# Patient Record
Sex: MALE | Race: WHITE | NOT HISPANIC OR LATINO | URBAN - METROPOLITAN AREA
[De-identification: names, ages, dates, MRNs, and addresses within clinical notes are randomized per-mention and may not be internally consistent; named-entity substitution may affect disease eponyms.]

---

## 2023-03-10 DIAGNOSIS — E78.5 DYSLIPIDEMIA: Primary | ICD-10-CM

## 2023-03-10 RX ORDER — ROSUVASTATIN CALCIUM 5 MG/1
5 TABLET, COATED ORAL DAILY
Qty: 90 TABLET | Refills: 3 | Status: SHIPPED | OUTPATIENT
Start: 2023-03-10 | End: 2024-03-09

## 2023-04-12 PROBLEM — E78.5 DYSLIPIDEMIA: Status: ACTIVE | Noted: 2023-04-12

## 2023-04-12 PROBLEM — I10 ESSENTIAL HYPERTENSION: Status: ACTIVE | Noted: 2023-04-12

## 2023-04-12 PROBLEM — J30.2 SEASONAL ALLERGIES: Status: ACTIVE | Noted: 2023-04-12

## 2023-04-12 PROBLEM — G47.33 OBSTRUCTIVE SLEEP APNEA: Status: ACTIVE | Noted: 2023-04-12
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
## 2023-04-12 PROBLEM — M25.569 JOINT PAIN, KNEE: Status: ACTIVE | Noted: 2023-04-12

## 2023-04-12 PROBLEM — K21.9 ESOPHAGEAL REFLUX: Status: ACTIVE | Noted: 2023-04-12

## 2023-04-12 PROBLEM — G47.19 EXCESSIVE DAYTIME SLEEPINESS: Status: ACTIVE | Noted: 2023-04-12

## 2023-04-12 PROBLEM — F32.A DEPRESSION: Status: ACTIVE | Noted: 2023-04-12

## 2023-04-12 PROBLEM — E66.01 MORBID OBESITY (MULTI): Status: ACTIVE | Noted: 2023-04-12

## 2023-04-12 PROBLEM — E34.9 HYPOTESTOSTERONISM: Status: ACTIVE | Noted: 2023-04-12

## 2023-04-12 PROBLEM — S16.1XXA CERVICAL STRAIN: Status: ACTIVE | Noted: 2023-04-12

## 2023-04-19 RX ORDER — CETIRIZINE HYDROCHLORIDE, PSEUDOEPHEDRINE HYDROCHLORIDE 5; 120 MG/1; MG/1
TABLET, FILM COATED, EXTENDED RELEASE ORAL
COMMUNITY
Start: 2016-08-10

## 2023-04-19 RX ORDER — ALBUTEROL SULFATE 90 UG/1
AEROSOL, METERED RESPIRATORY (INHALATION)
COMMUNITY
Start: 2015-08-25

## 2023-04-19 RX ORDER — ESCITALOPRAM OXALATE 10 MG/1
1 TABLET ORAL DAILY
COMMUNITY
Start: 2021-02-11 | End: 2024-04-01 | Stop reason: SDUPTHER

## 2023-04-19 RX ORDER — NYSTATIN 100000 [USP'U]/G
POWDER TOPICAL
COMMUNITY
Start: 2020-12-11

## 2023-04-19 RX ORDER — OMEPRAZOLE 20 MG/1
CAPSULE, DELAYED RELEASE ORAL
COMMUNITY
Start: 2016-08-10

## 2023-04-19 RX ORDER — SEMAGLUTIDE 0.5 MG/.5ML
INJECTION, SOLUTION SUBCUTANEOUS
COMMUNITY
Start: 2021-07-16

## 2023-04-19 RX ORDER — ANASTROZOLE 1 MG/1
TABLET ORAL
COMMUNITY

## 2023-10-16 DIAGNOSIS — M25.569 PAIN IN UNSPECIFIED KNEE: ICD-10-CM

## 2023-10-16 RX ORDER — INDOMETHACIN 75 MG/1
CAPSULE, EXTENDED RELEASE ORAL
Qty: 30 CAPSULE | Refills: 3 | Status: SHIPPED | OUTPATIENT
Start: 2023-10-16 | End: 2024-03-22

## 2023-12-08 ENCOUNTER — TELEPHONE (OUTPATIENT)
Dept: PRIMARY CARE | Facility: CLINIC | Age: 38
End: 2023-12-08
Payer: COMMERCIAL

## 2023-12-08 NOTE — TELEPHONE ENCOUNTER
Can you please run the new Zepbound through and see if my insurance will cover it now?     Medication is not covered

## 2024-01-19 ENCOUNTER — OFFICE VISIT (OUTPATIENT)
Dept: ORTHOPEDIC SURGERY | Facility: HOSPITAL | Age: 39
End: 2024-01-19
Payer: COMMERCIAL

## 2024-01-19 DIAGNOSIS — M25.561 ARTHRALGIA OF BOTH KNEES: Primary | ICD-10-CM

## 2024-01-19 DIAGNOSIS — M25.562 ARTHRALGIA OF BOTH KNEES: Primary | ICD-10-CM

## 2024-01-19 PROCEDURE — 2500000005 HC RX 250 GENERAL PHARMACY W/O HCPCS: Performed by: ORTHOPAEDIC SURGERY

## 2024-01-19 PROCEDURE — 99213 OFFICE O/P EST LOW 20 MIN: CPT | Performed by: ORTHOPAEDIC SURGERY

## 2024-01-19 PROCEDURE — 20610 DRAIN/INJ JOINT/BURSA W/O US: CPT | Performed by: ORTHOPAEDIC SURGERY

## 2024-01-19 PROCEDURE — 2500000004 HC RX 250 GENERAL PHARMACY W/ HCPCS (ALT 636 FOR OP/ED): Performed by: ORTHOPAEDIC SURGERY

## 2024-01-19 RX ORDER — LIDOCAINE HYDROCHLORIDE 10 MG/ML
4 INJECTION INFILTRATION; PERINEURAL
Status: COMPLETED | OUTPATIENT
Start: 2024-01-19 | End: 2024-01-19

## 2024-01-19 RX ORDER — METHYLPREDNISOLONE ACETATE 40 MG/ML
40 INJECTION, SUSPENSION INTRA-ARTICULAR; INTRALESIONAL; INTRAMUSCULAR; SOFT TISSUE
Status: COMPLETED | OUTPATIENT
Start: 2024-01-19 | End: 2024-01-19

## 2024-01-19 RX ADMIN — METHYLPREDNISOLONE ACETATE 40 MG: 40 INJECTION, SUSPENSION INTRA-ARTICULAR; INTRALESIONAL; INTRAMUSCULAR; INTRASYNOVIAL; SOFT TISSUE at 11:55

## 2024-01-19 RX ADMIN — LIDOCAINE HYDROCHLORIDE 4 ML: 10 INJECTION, SOLUTION INFILTRATION; PERINEURAL at 11:55

## 2024-01-19 NOTE — PROGRESS NOTES
Patient returns to see me today.  He has known osteoarthritis of both knees he wanted proceed with cortisone injection I think that is reasonable.    Their limb is warm, and well-perfused.  They have intact sensation to light touch in all lower extremity dermatomes.  Their quadriceps and hamstring strength is 5 of 5.  Their skin is intact.  Their limb is stable from a ligament standpoint  There is a trace effusion.  There is one out of 3 patellofemoral crepitus    Range of motion is preserved  Patient ID: Rene Taylor is a 38 y.o. male.    L Inj/Asp: bilateral knee on 1/19/2024 11:55 AM  Indications: pain  Details: 22 G needle, anterior approach  Medications (Right): 40 mg methylPREDNISolone acetate 40 mg/mL; 4 mL lidocaine 10 mg/mL (1 %)  Medications (Left): 40 mg methylPREDNISolone acetate 40 mg/mL; 4 mL lidocaine 10 mg/mL (1 %)    Under sterile conditions the left and right knee were injected with 4cc of lidocaine 40mg DepoMedrol.  The patient tolerated the procedure well.  There were no apparent complications.  Sterile bandage was applied.  Procedure, treatment alternatives, risks and benefits explained, specific risks discussed. Consent was given by the patient. Immediately prior to procedure a time out was called to verify the correct patient, procedure, equipment, support staff and site/side marked as required. Patient was prepped and draped in the usual sterile fashion.       Patient tolerated the injections well no apparent complications follow-up as needed

## 2024-03-11 ENCOUNTER — TELEPHONE (OUTPATIENT)
Dept: ORTHOPEDIC SURGERY | Facility: HOSPITAL | Age: 39
End: 2024-03-11
Payer: COMMERCIAL

## 2024-03-11 NOTE — TELEPHONE ENCOUNTER
Called patient to find out which laterality we would be  seeing him for his shoulder pain.  Patient didn't answer.  Left a voicemail asking him to call the office at [phone] and let us know so that we could put in an x-ray order to have at the time of visit. CT

## 2024-03-13 ENCOUNTER — APPOINTMENT (OUTPATIENT)
Dept: ORTHOPEDIC SURGERY | Facility: HOSPITAL | Age: 39
End: 2024-03-13
Payer: COMMERCIAL

## 2024-03-22 DIAGNOSIS — M25.569 PAIN IN UNSPECIFIED KNEE: ICD-10-CM

## 2024-03-22 RX ORDER — INDOMETHACIN 75 MG/1
CAPSULE, EXTENDED RELEASE ORAL
Qty: 90 CAPSULE | Refills: 1 | Status: SHIPPED | OUTPATIENT
Start: 2024-03-22

## 2024-03-29 ENCOUNTER — PATIENT MESSAGE (OUTPATIENT)
Dept: PRIMARY CARE | Facility: CLINIC | Age: 39
End: 2024-03-29
Payer: COMMERCIAL

## 2024-03-29 DIAGNOSIS — F32.A DEPRESSION, UNSPECIFIED DEPRESSION TYPE: ICD-10-CM

## 2024-04-01 RX ORDER — ESCITALOPRAM OXALATE 10 MG/1
10 TABLET ORAL DAILY
Qty: 30 TABLET | Refills: 11 | Status: SHIPPED | OUTPATIENT
Start: 2024-04-01 | End: 2025-04-01

## 2024-04-01 NOTE — TELEPHONE ENCOUNTER
From: Rene Taylor  To: Chano Salcido DO  Sent: 3/29/2024 4:10 PM EDT  Subject: Previous dr rx    My previous dr had me on ESCITALOPRAM 10 MG TABLET and I am out of refills, can you please send a rx for it?

## 2024-04-15 ENCOUNTER — OFFICE VISIT (OUTPATIENT)
Dept: ORTHOPEDIC SURGERY | Facility: HOSPITAL | Age: 39
End: 2024-04-15
Payer: COMMERCIAL

## 2024-04-15 ENCOUNTER — HOSPITAL ENCOUNTER (OUTPATIENT)
Dept: RADIOLOGY | Facility: HOSPITAL | Age: 39
Discharge: HOME | End: 2024-04-15
Payer: COMMERCIAL

## 2024-04-15 DIAGNOSIS — M75.82 TENDONITIS OF BOTH ROTATOR CUFFS: Primary | ICD-10-CM

## 2024-04-15 DIAGNOSIS — M25.511 BILATERAL SHOULDER PAIN, UNSPECIFIED CHRONICITY: ICD-10-CM

## 2024-04-15 DIAGNOSIS — M25.512 BILATERAL SHOULDER PAIN, UNSPECIFIED CHRONICITY: ICD-10-CM

## 2024-04-15 DIAGNOSIS — M75.81 TENDONITIS OF BOTH ROTATOR CUFFS: Primary | ICD-10-CM

## 2024-04-15 PROCEDURE — 73030 X-RAY EXAM OF SHOULDER: CPT | Mod: BILATERAL PROCEDURE | Performed by: RADIOLOGY

## 2024-04-15 PROCEDURE — 99214 OFFICE O/P EST MOD 30 MIN: CPT | Performed by: ORTHOPAEDIC SURGERY

## 2024-04-15 PROCEDURE — 1036F TOBACCO NON-USER: CPT | Performed by: ORTHOPAEDIC SURGERY

## 2024-04-15 PROCEDURE — 73030 X-RAY EXAM OF SHOULDER: CPT | Mod: 50

## 2024-04-15 ASSESSMENT — PAIN SCALES - GENERAL: PAINLEVEL_OUTOF10: 5 - MODERATE PAIN

## 2024-04-15 ASSESSMENT — PAIN - FUNCTIONAL ASSESSMENT: PAIN_FUNCTIONAL_ASSESSMENT: 0-10

## 2024-04-15 ASSESSMENT — PAIN DESCRIPTION - DESCRIPTORS: DESCRIPTORS: ACHING;SHARP

## 2024-04-15 NOTE — PROGRESS NOTES
Chief Complaint: Pain of the Right Shoulder (R > L) and Pain of the Left Shoulder     HPI:  Rene Taylor is a 38 y.o. male right-hand-dominant  presenting with bilateral shoulder pain right greater than left for the past year of insidious onset.  He reports a generalized soreness with a sharp sensation over the anterior bilateral shoulders when he is pushing himself up off the ground or abducting his shoulders with weight.  He has been using his indomethacin prescribed to him for his bilateral knee pain with some improvement of his symptoms.  He denies prior shoulder injuries.  He denies any numbness or tingling into the bilateral upper extremities.  He presents for treatment recommendations.    Objective     ROS:  Constitutional: No fever, no chills, not feeling tired, no recent weight gain and no recent weight loss  ENT: No nosebleeds  Cardiovascular: No chest pain  Respiratory: No shortness of breath and no cough  Gastrointestinal: No abdominal pain, no nausea, no diarrhea, and no vomiting  Musculoskeletal: Positive for bilateral shoulder pain right greater than left  Integumentary: No rashes and no skin lesions  Neurological: No headache  Psychiatric: No sleep disturbances no depression  Endocrine: No muscle weakness and no muscle cramps  Hematologic/lymphatic: No swelling glands and no tendency for easy bruising    Past Medical History:   Diagnosis Date    Personal history of other diseases of the nervous system and sense organs     History of sleep apnea    Sleep apnea         Past Surgical History:   Procedure Laterality Date    KNEE SURGERY Left         Social Connections: Not on file          Physical Exam:  General appearance: WN, WD male in no acute distress  Skin: No rashes, lesions or wounds  Head: Normocephalic, no evidence of trauma  Eye: EOMI, conjunctiva clear, no discharge  ENT: Nares patent  Neck: No abnormal contour, tracheal midline  Chest/lungs: No respiratory distress, speaking  in complete sentences  Musculoskeletal: RIGHT SHOULDER: Tender to palpation over the bicipital groove, anterior shoulder.  Positive Gibson Be.  Positive Neer's.  Positive Laclede's.  4+/5 manual muscle testing shoulder abduction, empty can, external rotation.  He has internal rotation to approximately L2. LEFT SHOULDER: Tender to palpation bicipital groove.  Positive Gibson Be.  Positive Neer's.  Positive Laclede's.  4+/5 manual muscle testing shoulder abduction, empty can, external rotation.  Internal rotation to approximately L2.  Negative Spurling's.  He has full and symmetric shoulder range of motion bilaterally.  2+ radial pulses bilaterally.  No muscle wasting, rigidity    Neurological: A&O x3, no focal deficits, intact bilateral UE  Psych: normal affect, mood, appearance      Image Results:  X-rays of the bilateral shoulders taken on 4/15/2024 reviewed with the patient in the office today and reveal no acute fractures or dislocations.      Assessment/Plan   Encounter Diagnoses:  Tendonitis of both rotator cuffs    Bilateral shoulder pain, unspecified chronicity    Orders Placed This Encounter    XR shoulder 2+ views bilateral    Referral to Physical Therapy       The patient and I discussed his clinical presentation and physical exam findings consistent with bilateral rotator cuff tendinitis, right greater than left.  We agreed upon continued conservative management at this time.  Since he is already taking indomethacin for his bilateral knees he will continue to take this for his shoulders.  Additionally, he was provided with a referral to physical therapy.  This will focus on capsular strength and stability, posture assessment and home exercise program.  Work modifications were discussed with the patient in the office today.  He should focus on an upright posture.  He can ice his shoulder 15-20 minutes at a time 2-3 times per day.  We did discuss the use of subacromial corticosteroid injection,  which we deferred to later if needed.  He will follow-up in 4 weeks if his symptoms persist or worsen.  He is in agreement this plan.  All his questions have been answered.        ** This office note was dictated using Dragon voice to text software and was not proofread for spelling or grammatical errors **

## 2024-06-12 ENCOUNTER — OFFICE VISIT (OUTPATIENT)
Dept: ORTHOPEDIC SURGERY | Facility: HOSPITAL | Age: 39
End: 2024-06-12
Payer: COMMERCIAL

## 2024-06-12 DIAGNOSIS — M25.561 ARTHRALGIA OF BOTH KNEES: Primary | ICD-10-CM

## 2024-06-12 DIAGNOSIS — M25.562 ARTHRALGIA OF BOTH KNEES: Primary | ICD-10-CM

## 2024-06-12 PROCEDURE — 99213 OFFICE O/P EST LOW 20 MIN: CPT | Performed by: PHYSICIAN ASSISTANT

## 2024-06-12 NOTE — PROGRESS NOTES
Bilateral knee    Patient is here today for follow-up on bilateral knee pain.  He has known osteoarthritis.  Imaging was reviewed.  He has had corticosteroid injections in the past that have been very beneficial.  He also recently saw Dr. Crowell for her shoulders and he had cortisone injections there and those are definitely feeling better.  He wants to proceed with the knee injections today and I think that is reasonable.      L Inj/Asp: bilateral knee on 6/14/2024 8:58 AM  Indications: pain  Details: 22 G needle, anterior approach  Medications (Right): 40 mg methylPREDNISolone acetate 40 mg/mL; 4 mL lidocaine 10 mg/mL (1 %)  Medications (Left): 40 mg methylPREDNISolone acetate 40 mg/mL; 4 mL lidocaine 10 mg/mL (1 %)    Under sterile conditions the left and right knee were injected with 4cc of lidocaine 40mg DepoMedrol.  The patient tolerated the procedure well.  There were no apparent complications.  Sterile bandage was applied.  Procedure, treatment alternatives, risks and benefits explained, specific risks discussed. Consent was given by the patient. Immediately prior to procedure a time out was called to verify the correct patient, procedure, equipment, support staff and site/side marked as required. Patient was prepped and draped in the usual sterile fashion.                 Viviane Fraga PA-C

## 2024-06-14 PROCEDURE — 20610 DRAIN/INJ JOINT/BURSA W/O US: CPT | Mod: 50 | Performed by: PHYSICIAN ASSISTANT

## 2024-06-14 RX ORDER — METHYLPREDNISOLONE ACETATE 40 MG/ML
40 INJECTION, SUSPENSION INTRA-ARTICULAR; INTRALESIONAL; INTRAMUSCULAR; SOFT TISSUE
Status: COMPLETED | OUTPATIENT
Start: 2024-06-14 | End: 2024-06-14

## 2024-06-14 RX ORDER — LIDOCAINE HYDROCHLORIDE 10 MG/ML
4 INJECTION INFILTRATION; PERINEURAL
Status: COMPLETED | OUTPATIENT
Start: 2024-06-14 | End: 2024-06-14

## 2024-07-29 ENCOUNTER — OFFICE VISIT (OUTPATIENT)
Dept: PRIMARY CARE | Facility: CLINIC | Age: 39
End: 2024-07-29
Payer: COMMERCIAL

## 2024-07-29 VITALS
BODY MASS INDEX: 44.1 KG/M2 | HEART RATE: 63 BPM | RESPIRATION RATE: 18 BRPM | WEIGHT: 315 LBS | OXYGEN SATURATION: 98 % | TEMPERATURE: 96.8 F | HEIGHT: 71 IN

## 2024-07-29 DIAGNOSIS — Z00.00 ANNUAL PHYSICAL EXAM: Primary | ICD-10-CM

## 2024-07-29 DIAGNOSIS — E11.9 DIABETES MELLITUS WITHOUT COMPLICATION (MULTI): ICD-10-CM
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
## 2024-07-29 PROBLEM — F41.9 ANXIETY: Status: ACTIVE | Noted: 2024-07-29

## 2024-07-29 PROBLEM — S16.1XXA CERVICAL STRAIN: Status: RESOLVED | Noted: 2023-04-12 | Resolved: 2024-07-29

## 2024-07-29 LAB
ALBUMIN SERPL-MCNC: 4.3 G/DL (ref 3.5–5)
ALP BLD-CCNC: 101 U/L (ref 35–125)
ALT SERPL-CCNC: 32 U/L (ref 5–40)
ANION GAP SERPL CALC-SCNC: 13 MMOL/L
AST SERPL-CCNC: 22 U/L (ref 5–40)
BASOPHILS # BLD AUTO: 0.06 X10*3/UL (ref 0–0.1)
BASOPHILS NFR BLD AUTO: 0.8 %
BILIRUB SERPL-MCNC: 0.7 MG/DL (ref 0.1–1.2)
BUN SERPL-MCNC: 19 MG/DL (ref 8–25)
CALCIUM SERPL-MCNC: 9.5 MG/DL (ref 8.5–10.4)
CHLORIDE SERPL-SCNC: 102 MMOL/L (ref 97–107)
CHOLEST SERPL-MCNC: 198 MG/DL (ref 133–200)
CHOLEST/HDLC SERPL: 5.1 {RATIO}
CO2 SERPL-SCNC: 24 MMOL/L (ref 24–31)
CREAT SERPL-MCNC: 0.9 MG/DL (ref 0.4–1.6)
EGFRCR SERPLBLD CKD-EPI 2021: >90 ML/MIN/1.73M*2
EOSINOPHIL # BLD AUTO: 0.3 X10*3/UL (ref 0–0.7)
EOSINOPHIL NFR BLD AUTO: 4 %
ERYTHROCYTE [DISTWIDTH] IN BLOOD BY AUTOMATED COUNT: 12.6 % (ref 11.5–14.5)
GLUCOSE SERPL-MCNC: 115 MG/DL (ref 65–99)
HCT VFR BLD AUTO: 46 % (ref 41–52)
HDLC SERPL-MCNC: 39 MG/DL
HGB BLD-MCNC: 15.1 G/DL (ref 13.5–17.5)
IMM GRANULOCYTES # BLD AUTO: 0.02 X10*3/UL (ref 0–0.7)
IMM GRANULOCYTES NFR BLD AUTO: 0.3 % (ref 0–0.9)
LDLC SERPL CALC-MCNC: 124 MG/DL (ref 65–130)
LYMPHOCYTES # BLD AUTO: 2.4 X10*3/UL (ref 1.2–4.8)
LYMPHOCYTES NFR BLD AUTO: 31.9 %
MCH RBC QN AUTO: 30 PG (ref 26–34)
MCHC RBC AUTO-ENTMCNC: 32.8 G/DL (ref 32–36)
MCV RBC AUTO: 91 FL (ref 80–100)
MONOCYTES # BLD AUTO: 0.72 X10*3/UL (ref 0.1–1)
MONOCYTES NFR BLD AUTO: 9.6 %
NEUTROPHILS # BLD AUTO: 4.02 X10*3/UL (ref 1.2–7.7)
NEUTROPHILS NFR BLD AUTO: 53.4 %
NRBC BLD-RTO: 0 /100 WBCS (ref 0–0)
PLATELET # BLD AUTO: 253 X10*3/UL (ref 150–450)
POC HEMOGLOBIN A1C: 5.6 % (ref 4.2–6.5)
POTASSIUM SERPL-SCNC: 4.4 MMOL/L (ref 3.4–5.1)
PROT SERPL-MCNC: 7.3 G/DL (ref 5.9–7.9)
RBC # BLD AUTO: 5.04 X10*6/UL (ref 4.5–5.9)
SODIUM SERPL-SCNC: 139 MMOL/L (ref 133–145)
TRIGL SERPL-MCNC: 174 MG/DL (ref 40–150)
TSH SERPL DL<=0.05 MIU/L-ACNC: 2.34 MIU/L (ref 0.27–4.2)
WBC # BLD AUTO: 7.5 X10*3/UL (ref 4.4–11.3)

## 2024-07-29 PROCEDURE — 84443 ASSAY THYROID STIM HORMONE: CPT | Performed by: FAMILY MEDICINE

## 2024-07-29 PROCEDURE — 85025 COMPLETE CBC W/AUTO DIFF WBC: CPT | Performed by: FAMILY MEDICINE

## 2024-07-29 PROCEDURE — 3008F BODY MASS INDEX DOCD: CPT | Performed by: FAMILY MEDICINE

## 2024-07-29 PROCEDURE — 99396 PREV VISIT EST AGE 40-64: CPT | Performed by: FAMILY MEDICINE

## 2024-07-29 PROCEDURE — 36415 COLL VENOUS BLD VENIPUNCTURE: CPT | Performed by: FAMILY MEDICINE

## 2024-07-29 PROCEDURE — 84075 ASSAY ALKALINE PHOSPHATASE: CPT | Performed by: FAMILY MEDICINE

## 2024-07-29 PROCEDURE — 80061 LIPID PANEL: CPT | Performed by: FAMILY MEDICINE

## 2024-07-29 PROCEDURE — 1036F TOBACCO NON-USER: CPT | Performed by: FAMILY MEDICINE

## 2024-07-29 PROCEDURE — 83036 HEMOGLOBIN GLYCOSYLATED A1C: CPT | Performed by: FAMILY MEDICINE

## 2024-07-29 RX ORDER — SEMAGLUTIDE 1 MG/.5ML
1 INJECTION, SOLUTION SUBCUTANEOUS
Qty: 2 ML | Refills: 3 | Status: SHIPPED | OUTPATIENT
Start: 2024-08-04 | End: 2024-08-26

## 2024-07-29 ASSESSMENT — PROMIS GLOBAL HEALTH SCALE
RATE_PHYSICAL_HEALTH: FAIR
EMOTIONAL_PROBLEMS: RARELY
RATE_SOCIAL_SATISFACTION: GOOD
RATE_GENERAL_HEALTH: FAIR
CARRYOUT_SOCIAL_ACTIVITIES: FAIR
RATE_QUALITY_OF_LIFE: GOOD
CARRYOUT_PHYSICAL_ACTIVITIES: MODERATELY
RATE_AVERAGE_FATIGUE: MODERATE
RATE_MENTAL_HEALTH: FAIR
RATE_AVERAGE_PAIN: 5

## 2024-07-29 ASSESSMENT — PAIN SCALES - GENERAL: PAINLEVEL: 5

## 2024-07-29 ASSESSMENT — ENCOUNTER SYMPTOMS
DEPRESSION: 0
OCCASIONAL FEELINGS OF UNSTEADINESS: 0
LOSS OF SENSATION IN FEET: 0

## 2024-07-29 ASSESSMENT — PATIENT HEALTH QUESTIONNAIRE - PHQ9
SUM OF ALL RESPONSES TO PHQ9 QUESTIONS 1 AND 2: 0
2. FEELING DOWN, DEPRESSED OR HOPELESS: NOT AT ALL
1. LITTLE INTEREST OR PLEASURE IN DOING THINGS: NOT AT ALL

## 2024-07-29 NOTE — PROGRESS NOTES
"Subjective   Patient ID: Rene Taylor is a 39 y.o. male who presents for Annual Exam.    HPI     Review of Systems    Objective   Pulse 63   Temp 36 °C (96.8 °F)   Resp 18   Ht 1.803 m (5' 11\")   Wt (!) 211 kg (466 lb)   SpO2 98%   BMI 64.99 kg/m²     Physical Exam  Vitals and nursing note reviewed.   Constitutional:       General: He is not in acute distress.  HENT:      Right Ear: Tympanic membrane and ear canal normal.      Left Ear: Tympanic membrane and ear canal normal.      Nose: Nose normal. No rhinorrhea.      Mouth/Throat:      Pharynx: Oropharynx is clear. No oropharyngeal exudate or posterior oropharyngeal erythema.      Comments: Dentition wnl  Eyes:      Extraocular Movements: Extraocular movements intact.      Conjunctiva/sclera: Conjunctivae normal.      Pupils: Pupils are equal, round, and reactive to light.   Neck:      Vascular: No carotid bruit.   Cardiovascular:      Rate and Rhythm: Normal rate and regular rhythm.      Heart sounds: Normal heart sounds. No murmur heard.  Pulmonary:      Breath sounds: Normal breath sounds. No wheezing or rhonchi.   Abdominal:      General: Bowel sounds are normal. There is no distension.      Palpations: Abdomen is soft. There is no mass.      Tenderness: There is no abdominal tenderness. There is no guarding or rebound.      Hernia: No hernia is present.   Musculoskeletal:         General: No swelling or tenderness. Normal range of motion.      Cervical back: Normal range of motion and neck supple.   Lymphadenopathy:      Cervical: No cervical adenopathy.   Skin:     General: Skin is warm.      Findings: No rash.   Neurological:      General: No focal deficit present.      Mental Status: He is alert.         Assessment/Plan   Problem List Items Addressed This Visit             ICD-10-CM    Diabetes mellitus without complication (Multi) E11.9    Relevant Medications    semaglutide, weight loss, (Wegovy) 1 mg/0.5 mL pen injector (Start on 8/4/2024)    Other " Relevant Orders    POCT glycosylated hemoglobin (Hb A1C) manually resulted (Completed)     Other Visit Diagnoses         Codes    Annual physical exam    -  Primary Z00.00    Relevant Orders    CBC and Auto Differential    Comprehensive Metabolic Panel    Lipid Panel    TSH with reflex to Free T4 if abnormal

## 2024-07-29 NOTE — PROGRESS NOTES
"Subjective   Patient ID: Rene Taylor is a 39 y.o. male who presents for Annual Exam.    HPI he is fasting     Review of Systems    Objective   Pulse 63   Temp 36 °C (96.8 °F)   Resp 18   Ht 1.803 m (5' 11\")   Wt (!) 211 kg (466 lb)   SpO2 98%   BMI 64.99 kg/m²     Physical Exam    Assessment/Plan          "

## 2024-07-31 ENCOUNTER — PATIENT MESSAGE (OUTPATIENT)
Dept: PRIMARY CARE | Facility: CLINIC | Age: 39
End: 2024-07-31
Payer: COMMERCIAL

## 2024-07-31 DIAGNOSIS — E11.9 DIABETES MELLITUS WITHOUT COMPLICATION (MULTI): Primary | ICD-10-CM

## 2024-07-31 DIAGNOSIS — E66.01 MORBID OBESITY (MULTI): ICD-10-CM

## 2024-08-12 ENCOUNTER — APPOINTMENT (OUTPATIENT)
Dept: PHARMACY | Facility: HOSPITAL | Age: 39
End: 2024-08-12
Payer: COMMERCIAL

## 2024-08-12 DIAGNOSIS — E11.9 DIABETES MELLITUS WITHOUT COMPLICATION (MULTI): ICD-10-CM

## 2024-08-12 DIAGNOSIS — E66.01 MORBID OBESITY (MULTI): ICD-10-CM

## 2024-08-12 RX ORDER — TIRZEPATIDE 2.5 MG/.5ML
2.5 INJECTION, SOLUTION SUBCUTANEOUS
Qty: 2 ML | Refills: 0 | Status: SHIPPED | OUTPATIENT
Start: 2024-08-12

## 2024-08-12 NOTE — PROGRESS NOTES
Patient received the following medication education on Mounjaro:    - Counseled patient on MOA, expectations, side effects, duration of therapy, contraindications, administration, and monitoring parameters.  - Provided detailed dosing and administration counseling to ensure proper technique.   - Reviewed Mounjaro titration schedule, starting with 2.5 mg once weekly to 5mg starting on the 5th week. Patient verbalized understanding  - Counseled patient on the benefits of GLP-1ra glycemic control and weight loss  - Reviewed storage requirements of Mounjaro when not in use, and when to administer the medication if a dose is missed.  - Advised patient that they may experience improved satiety after meals and portion sizes of meals may be reduced as doses of Mounjaro increase.    Medication education provided by JOSEPH Chen, PharmD, BCPS

## 2024-10-21 DIAGNOSIS — E66.01 MORBID OBESITY (MULTI): ICD-10-CM

## 2024-10-21 DIAGNOSIS — M25.569 ARTHRALGIA OF KNEE, UNSPECIFIED LATERALITY: Primary | ICD-10-CM

## 2024-10-21 DIAGNOSIS — M25.569 PAIN IN UNSPECIFIED KNEE: ICD-10-CM

## 2024-10-21 RX ORDER — INDOMETHACIN 75 MG/1
75 CAPSULE, EXTENDED RELEASE ORAL
Qty: 90 CAPSULE | Refills: 1 | Status: SHIPPED | OUTPATIENT
Start: 2024-10-21

## 2024-10-30 ENCOUNTER — APPOINTMENT (OUTPATIENT)
Dept: ORTHOPEDIC SURGERY | Facility: HOSPITAL | Age: 39
End: 2024-10-30
Payer: COMMERCIAL

## 2024-10-30 DIAGNOSIS — M17.0 PRIMARY OSTEOARTHRITIS OF BOTH KNEES: Primary | ICD-10-CM

## 2024-10-30 PROCEDURE — 2500000004 HC RX 250 GENERAL PHARMACY W/ HCPCS (ALT 636 FOR OP/ED): Performed by: SPECIALIST/TECHNOLOGIST

## 2024-10-30 PROCEDURE — 99213 OFFICE O/P EST LOW 20 MIN: CPT | Performed by: SPECIALIST/TECHNOLOGIST

## 2024-10-30 PROCEDURE — 20610 DRAIN/INJ JOINT/BURSA W/O US: CPT | Mod: 50 | Performed by: SPECIALIST/TECHNOLOGIST

## 2024-10-30 PROCEDURE — 99213 OFFICE O/P EST LOW 20 MIN: CPT | Mod: 25 | Performed by: SPECIALIST/TECHNOLOGIST

## 2024-10-30 PROCEDURE — 1036F TOBACCO NON-USER: CPT | Performed by: SPECIALIST/TECHNOLOGIST

## 2024-10-30 PROCEDURE — 3049F LDL-C 100-129 MG/DL: CPT | Performed by: SPECIALIST/TECHNOLOGIST

## 2024-10-31 PROCEDURE — 20610 DRAIN/INJ JOINT/BURSA W/O US: CPT | Mod: 50 | Performed by: SPECIALIST/TECHNOLOGIST

## 2024-10-31 RX ORDER — LIDOCAINE HYDROCHLORIDE 10 MG/ML
4 INJECTION, SOLUTION INFILTRATION; PERINEURAL
Status: COMPLETED | OUTPATIENT
Start: 2024-10-31 | End: 2024-10-31

## 2024-10-31 RX ORDER — METHYLPREDNISOLONE ACETATE 40 MG/ML
40 INJECTION, SUSPENSION INTRA-ARTICULAR; INTRALESIONAL; INTRAMUSCULAR; SOFT TISSUE
Status: COMPLETED | OUTPATIENT
Start: 2024-10-31 | End: 2024-10-31

## 2024-12-04 NOTE — PROGRESS NOTES
"  Patient is a 39 y.o. male presenting with hypogonadism    SUBJECTIVE:  HPI   He has history of hypogonadism. His testosterone level was 142 in 2/2024. His LH level was 8.5. He previously anastrozole arimadex in 2019 for history of hypogonadism and infertility. He denies any urinary symptoms today. He does not smoke.    He and his wife are no longer attempting to conceive.    No family history of prostate cancer.     No results found for: \"URINECULTURE\"     Past Medical History:   Diagnosis Date    Personal history of other diseases of the nervous system and sense organs     History of sleep apnea    Sleep apnea       Past Surgical History:   Procedure Laterality Date    KNEE SURGERY Left       No family history on file.   Social History     Socioeconomic History    Marital status:    Tobacco Use    Smoking status: Never    Smokeless tobacco: Never   Vaping Use    Vaping status: Never Used   Substance and Sexual Activity    Alcohol use: Yes     Alcohol/week: 4.0 standard drinks of alcohol     Types: 4 Standard drinks or equivalent per week    Drug use: Never    Sexual activity: Yes     Partners: Female        Review of Systems   Constitutional: denies any unintentional weight loss or change in strength.  Integumentary: denies any rashes or pruritus.  Eyes: denies any double vision or eye pain.  Ear/Nose/Mouth/Throat: denies any nosebleeds or gum bleeds.  Cardiovascular: denies any chest pain or syncope.  Respiratory: denies hemoptysis.  Gastrointestinal: denies nausea or vomiting.  Musculoskeletal: denies muscle cramping or weakness.  Neurologic: denies convulsions or seizures.  Hematologic/Lymphatic: denies bleeding tendencies.  Endocrine: denies heat/cold intolerance.  All other systems have been reviewed and are negative unless otherwise noted in the HPI.    OBJECTIVE:  Visit Vitals  Temp 35.8 °C (96.4 °F)     Physical Exam   Constitutional: No obvious distress.  Eyes: Non-injected conjunctiva, sclera " "clear, EOMI.  Ears/Nose/Mouth/Throat: No obvious drainage per ears or nose.  Cardiovascular: Extremities are warm and well perfused. No edema, cyanosis or pallor.  Respiratory: No audible wheezing/stridor; respirations do not appear labored.  Gastrointestinal: Abdomen soft, not distended.  Musculoskeletal: Normal ROM of extremities.  Skin: No obvious rashes or open sores.  Neurologic: Alert and oriented, CN 2-12 grossly intact.  Psychiatric: Answers questions appropriately with normal affect.  Hematologic/Lymphatic/Immunologic: No obvious bruises or sites of spontaneous bleeding.  Genitourinary: No CVA tenderness, bladder not palpable.     Labs:  Lab Results   Component Value Date    WBC 7.5 07/29/2024    HGB 15.1 07/29/2024    HCT 46.0 07/29/2024     07/29/2024    CHOL 198 07/29/2024    TRIG 174 (H) 07/29/2024    HDL 39.0 (L) 07/29/2024    ALT 32 07/29/2024    AST 22 07/29/2024     07/29/2024    K 4.4 07/29/2024     07/29/2024    CREATININE 0.90 07/29/2024    BUN 19 07/29/2024    CO2 24 07/29/2024    TSH 2.34 07/29/2024    PSA 0.37 07/16/2021    HGBA1C 5.6 07/29/2024     No results found for: \"KPSAT\", \"KPSAP\"  IMAGING:      PROCEDURES:    ASSESSMENT/PLAN:  Problem List Items Addressed This Visit       Morbid obesity (Multi)    Low testosterone - Primary    Relevant Orders    POCT UA Automated manually resulted (Completed)      He has hypogonadism. His testosterone was 142 in 02/2023. His LH level was 8.5. Labs will be done.  We discussed treatment with Testopel or Xyosted.  He will try Xyosted. Patient understands that he will require monitoring of his blood count and liver function, and that there is a slightly higher risk of heart disease when taking Xyosted.  Weight loss was recommended.  Repeat LH and testosterone.    All questions were answered to the patient’s satisfaction.  Patient agrees with the plan and wishes to proceed.  Follow-up will be scheduled appropriately.     Scribe " Attestation  By signing my name below, I, Jack Liu,   attest that this documentation has been prepared under the direction and in the presence of Travis Camacho MD.

## 2024-12-05 ENCOUNTER — APPOINTMENT (OUTPATIENT)
Dept: UROLOGY | Facility: CLINIC | Age: 39
End: 2024-12-05
Payer: COMMERCIAL

## 2024-12-05 VITALS — WEIGHT: 315 LBS | HEIGHT: 71 IN | TEMPERATURE: 96.4 F | BODY MASS INDEX: 44.1 KG/M2

## 2024-12-05 DIAGNOSIS — R79.89 LOW TESTOSTERONE: ICD-10-CM

## 2024-12-05 DIAGNOSIS — E66.01 MORBID OBESITY (MULTI): ICD-10-CM

## 2024-12-05 DIAGNOSIS — E66.813 CLASS 3 SEVERE OBESITY WITH BODY MASS INDEX (BMI) OF 60.0 TO 69.9 IN ADULT, UNSPECIFIED OBESITY TYPE, UNSPECIFIED WHETHER SERIOUS COMORBIDITY PRESENT: Primary | ICD-10-CM

## 2024-12-05 DIAGNOSIS — E66.01 CLASS 3 SEVERE OBESITY WITH BODY MASS INDEX (BMI) OF 60.0 TO 69.9 IN ADULT, UNSPECIFIED OBESITY TYPE, UNSPECIFIED WHETHER SERIOUS COMORBIDITY PRESENT: Primary | ICD-10-CM

## 2024-12-05 PROBLEM — E66.9 OBESITY (BMI 30-39.9): Status: RESOLVED | Noted: 2024-12-05 | Resolved: 2024-12-05

## 2024-12-05 PROBLEM — E66.9 OBESITY: Status: ACTIVE | Noted: 2023-04-12

## 2024-12-05 PROBLEM — E66.9 OBESITY (BMI 30-39.9): Status: ACTIVE | Noted: 2024-12-05

## 2024-12-05 LAB
POC APPEARANCE, URINE: CLEAR
POC BILIRUBIN, URINE: NEGATIVE
POC BLOOD, URINE: NEGATIVE
POC COLOR, URINE: YELLOW
POC GLUCOSE, URINE: NEGATIVE MG/DL
POC KETONES, URINE: NEGATIVE MG/DL
POC LEUKOCYTES, URINE: NEGATIVE
POC NITRITE,URINE: NEGATIVE
POC PH, URINE: 7 PH
POC PROTEIN, URINE: NEGATIVE MG/DL
POC SPECIFIC GRAVITY, URINE: 1.01
POC UROBILINOGEN, URINE: 0.2 EU/DL

## 2024-12-05 PROCEDURE — 3008F BODY MASS INDEX DOCD: CPT | Performed by: UROLOGY

## 2024-12-05 PROCEDURE — G2211 COMPLEX E/M VISIT ADD ON: HCPCS | Performed by: UROLOGY

## 2024-12-05 PROCEDURE — 81003 URINALYSIS AUTO W/O SCOPE: CPT | Performed by: UROLOGY

## 2024-12-05 PROCEDURE — 3049F LDL-C 100-129 MG/DL: CPT | Performed by: UROLOGY

## 2024-12-05 PROCEDURE — 99204 OFFICE O/P NEW MOD 45 MIN: CPT | Performed by: UROLOGY

## 2024-12-05 RX ORDER — TESTOSTERONE ENANTHATE 100 MG/.5ML
100 INJECTION SUBCUTANEOUS
Qty: 4 EACH | Refills: 5 | Status: SHIPPED | OUTPATIENT
Start: 2024-12-05

## 2024-12-05 ASSESSMENT — PAIN SCALES - GENERAL: PAINLEVEL_OUTOF10: 0-NO PAIN

## 2025-01-09 ENCOUNTER — TELEPHONE (OUTPATIENT)
Dept: SCHEDULING | Age: 40
End: 2025-01-09
Payer: COMMERCIAL

## 2025-01-23 ENCOUNTER — APPOINTMENT (OUTPATIENT)
Dept: UROLOGY | Facility: CLINIC | Age: 40
End: 2025-01-23
Payer: COMMERCIAL

## 2025-02-21 ENCOUNTER — TELEPHONE (OUTPATIENT)
Dept: UROLOGY | Facility: CLINIC | Age: 40
End: 2025-02-21
Payer: COMMERCIAL

## 2025-03-03 ENCOUNTER — APPOINTMENT (OUTPATIENT)
Dept: ORTHOPEDIC SURGERY | Facility: HOSPITAL | Age: 40
End: 2025-03-03
Payer: COMMERCIAL

## 2025-03-03 DIAGNOSIS — M17.0 PRIMARY OSTEOARTHRITIS OF BOTH KNEES: Primary | ICD-10-CM

## 2025-03-03 PROCEDURE — 99213 OFFICE O/P EST LOW 20 MIN: CPT | Mod: 25 | Performed by: SPECIALIST/TECHNOLOGIST

## 2025-03-03 PROCEDURE — 20610 DRAIN/INJ JOINT/BURSA W/O US: CPT | Mod: 50 | Performed by: SPECIALIST/TECHNOLOGIST

## 2025-03-03 PROCEDURE — 2500000004 HC RX 250 GENERAL PHARMACY W/ HCPCS (ALT 636 FOR OP/ED): Performed by: SPECIALIST/TECHNOLOGIST

## 2025-03-03 PROCEDURE — 99213 OFFICE O/P EST LOW 20 MIN: CPT | Performed by: SPECIALIST/TECHNOLOGIST

## 2025-03-03 PROCEDURE — 1036F TOBACCO NON-USER: CPT | Performed by: SPECIALIST/TECHNOLOGIST

## 2025-03-04 PROCEDURE — 20610 DRAIN/INJ JOINT/BURSA W/O US: CPT | Mod: 50 | Performed by: SPECIALIST/TECHNOLOGIST

## 2025-03-04 RX ORDER — METHYLPREDNISOLONE ACETATE 40 MG/ML
40 INJECTION, SUSPENSION INTRA-ARTICULAR; INTRALESIONAL; INTRAMUSCULAR; SOFT TISSUE
Status: COMPLETED | OUTPATIENT
Start: 2025-03-04 | End: 2025-03-04

## 2025-03-04 RX ORDER — LIDOCAINE HYDROCHLORIDE 10 MG/ML
4 INJECTION, SOLUTION INFILTRATION; PERINEURAL
Status: COMPLETED | OUTPATIENT
Start: 2025-03-04 | End: 2025-03-04

## 2025-03-04 RX ADMIN — METHYLPREDNISOLONE ACETATE 40 MG: 40 INJECTION, SUSPENSION INTRA-ARTICULAR; INTRALESIONAL; INTRAMUSCULAR; INTRASYNOVIAL; SOFT TISSUE at 12:57

## 2025-03-04 RX ADMIN — LIDOCAINE HYDROCHLORIDE 4 ML: 10 INJECTION, SOLUTION INFILTRATION; PERINEURAL at 12:57

## 2025-03-04 NOTE — PROGRESS NOTES
Chief Complaint: No chief complaint on file.       HPI:  Rene Taylor is a 39 y.o. male presenting today for a follow-up for his bilateral knees.  He was previously seen on 10/30/2024 where bilateral corticosteroid injections were performed.  He states that these are still giving him approximately 3 months of relief.  At the time of his previous visit he was in the middle of a move into a new residence and states that the move went medially and he is in the new house.  Denies adverse events or issues since last visit.  He continues to report an achiness and throbbing is throughout the entire knees that worsens with prolonged walking and up and down stairs.  Continues to deny numbness or tingling in the bilateral lower extremities.  Presents for treatment recommendations.    Objective     ROS:  Constitutional: No fever, no chills, not feeling tired, no recent weight gain and no recent weight loss  ENT: No nosebleeds  Cardiovascular: No chest pain  Respiratory: No shortness of breath and no cough  Gastrointestinal: No abdominal pain, no nausea, no diarrhea, and no vomiting  Musculoskeletal: Positive for bilateral knee pain  Integumentary: No rashes and no skin lesions  Neurological: No headache  Psychiatric: No sleep disturbances no depression  Endocrine: No muscle weakness and no muscle cramps  Hematologic/lymphatic: No swelling glands and no tendency for easy bruising    Past Medical History:   Diagnosis Date    Personal history of other diseases of the nervous system and sense organs     History of sleep apnea    Sleep apnea         Past Surgical History:   Procedure Laterality Date    KNEE SURGERY Left         Social Connections: Not on file          Physical Exam:  General appearance: WN, WD male, in no acute distress  Skin: No rashes, lesions or wounds  Head: Normocephalic, no evidence of trauma  Eye: EOMI, conjunctiva clear, no discharge  ENT: Nares patent  Neck: No abnormal contour, tracheal  midline  Chest/lungs: No respiratory distress, speaking in complete sentences  Musculoskeletal: He has tenderness to palpation over the bilateral medial and lateral joint lines.  Stable valgus and varus stress test at 0 and 30 degrees bilaterally.  Patellofemoral crepitus noted bilaterally.  Trace effusion.  5/5 manual muscle testing seated knee flexion and extension bilaterally.  No decreased ROM, muscle wasting, rigidity    Neurological: A&O x3, no focal deficits, intact bilateral LE  Psych: normal affect, mood, appearance    Assessment/Plan   Encounter Diagnoses:  No diagnosis found.  Bilateral knee osteoarthritis  No orders of the defined types were placed in this encounter.      The patient and I discussed his clinical presentation and physical exam findings consistent with bilateral knee osteoarthritis.  We agreed upon continued conservative treatment.  Since he is still experiencing approximately 3 months of symptomatic relief we agreed upon bilateral intra-articular corticosteroid injections.  He tolerated these procedures well.  We also discussed thinking of considering viscosupplementation as an alternative to the corticosteroid injections.  He will consider this for future use.  I encouraged him to ice his knee 15 to 20 minutes 2-3 times per day.  Okay to take over-the-counter Tylenol and ibuprofen as needed.  I am happy to see him back on an as-needed basis.  He is in agreement the plan.  His questions have been answered.    L Inj/Asp: bilateral knee on 3/4/2025 12:57 PM  Indications: pain  Details: 25 G needle, anteromedial approach  Medications (Right): 40 mg methylPREDNISolone acetate 40 mg/mL; 4 mL lidocaine 10 mg/mL (1 %)  Medications (Left): 40 mg methylPREDNISolone acetate 40 mg/mL; 4 mL lidocaine 10 mg/mL (1 %)  Outcome: tolerated well, no immediate complications  Procedure, treatment alternatives, risks and benefits explained, specific risks discussed. Consent was given by the patient.  Immediately prior to procedure a time out was called to verify the correct patient, procedure, equipment, support staff and site/side marked as required. Patient was prepped and draped in the usual sterile fashion.             ** This office note was dictated using Dragon voice to text software and was not proofread for spelling or grammatical errors **

## 2025-03-06 ENCOUNTER — APPOINTMENT (OUTPATIENT)
Dept: UROLOGY | Facility: CLINIC | Age: 40
End: 2025-03-06
Payer: COMMERCIAL

## 2025-03-22 ENCOUNTER — ANCILLARY PROCEDURE (OUTPATIENT)
Dept: URGENT CARE | Age: 40
End: 2025-03-22
Payer: COMMERCIAL

## 2025-03-22 ENCOUNTER — OFFICE VISIT (OUTPATIENT)
Dept: URGENT CARE | Age: 40
End: 2025-03-22
Payer: COMMERCIAL

## 2025-03-22 VITALS
TEMPERATURE: 98 F | HEART RATE: 71 BPM | DIASTOLIC BLOOD PRESSURE: 83 MMHG | OXYGEN SATURATION: 99 % | SYSTOLIC BLOOD PRESSURE: 126 MMHG

## 2025-03-22 DIAGNOSIS — S83.92XA SPRAIN OF LEFT KNEE, UNSPECIFIED LIGAMENT, INITIAL ENCOUNTER: ICD-10-CM

## 2025-03-22 DIAGNOSIS — M17.12 OSTEOARTHRITIS OF LEFT KNEE, UNSPECIFIED OSTEOARTHRITIS TYPE: Primary | ICD-10-CM

## 2025-03-22 DIAGNOSIS — M25.562 LEFT KNEE PAIN, UNSPECIFIED CHRONICITY: ICD-10-CM

## 2025-03-22 PROCEDURE — 73562 X-RAY EXAM OF KNEE 3: CPT | Mod: LEFT SIDE

## 2025-03-22 ASSESSMENT — PAIN SCALES - GENERAL: PAINLEVEL_OUTOF10: 10-WORST PAIN EVER

## 2025-03-22 ASSESSMENT — ENCOUNTER SYMPTOMS: ARTHRALGIAS: 1

## 2025-03-22 NOTE — PROGRESS NOTES
Subjective   Patient ID: Rene Taylor is a 39 y.o. male. They present today with a chief complaint of No chief complaint on file..    History of Present Illness  Patient is a 39-year-old male with history of left knee surgery who presents urgent care today with a complaint of left knee pain.  He states Friday evening, he was playing with his 4-year-old and stopped abruptly to keep from falling on him.  He notes he has had significant pain, primarily in the anterior aspect of his knee since that time.  He notes standing and walking makes the pain worse.  Rest makes the pain better.  He denies any blunt trauma or any other injuries.  He is not on blood thinners.      History provided by:  Patient      Past Medical History  Allergies as of 03/22/2025 - Reviewed 03/04/2025   Allergen Reaction Noted    Animal dander Runny nose 04/15/2024    Cat dander Unknown 08/26/2019       (Not in a hospital admission)         Past Medical History:   Diagnosis Date    Personal history of other diseases of the nervous system and sense organs     History of sleep apnea    Sleep apnea        Past Surgical History:   Procedure Laterality Date    KNEE SURGERY Left         reports that he has never smoked. He has never used smokeless tobacco. He reports current alcohol use of about 4.0 standard drinks of alcohol per week. He reports that he does not use drugs.    Review of Systems  Review of Systems   Musculoskeletal:  Positive for arthralgias.                                  Objective    There were no vitals filed for this visit.  No LMP for male patient.    Physical Exam  Vitals and nursing note reviewed.   Constitutional:       General: He is not in acute distress.     Appearance: Normal appearance. He is not ill-appearing, toxic-appearing or diaphoretic.   HENT:      Head: Normocephalic and atraumatic.      Mouth/Throat:      Mouth: Mucous membranes are moist.   Eyes:      Extraocular Movements: Extraocular movements intact.       Conjunctiva/sclera: Conjunctivae normal.      Pupils: Pupils are equal, round, and reactive to light.   Cardiovascular:      Rate and Rhythm: Normal rate and regular rhythm.      Pulses: Normal pulses.      Heart sounds: Normal heart sounds.   Pulmonary:      Effort: Pulmonary effort is normal. No respiratory distress.      Breath sounds: Normal breath sounds. No stridor. No wheezing, rhonchi or rales.   Chest:      Chest wall: No tenderness.   Musculoskeletal:         General: Tenderness and signs of injury present. No swelling or deformity. Normal range of motion.      Cervical back: Normal range of motion and neck supple.      Right knee: Normal.      Left knee: Bony tenderness present. No swelling, deformity, effusion, erythema, ecchymosis, lacerations or crepitus. Normal range of motion. Tenderness present over the LCL and patellar tendon. No LCL laxity, MCL laxity, ACL laxity or PCL laxity.Normal alignment, normal meniscus and normal patellar mobility. Normal pulse.      Instability Tests: Anterior drawer test negative. Posterior drawer test negative. Anterior Lachman test negative. Medial Fede test negative and lateral Fede test negative.        Legs:    Skin:     General: Skin is warm and dry.      Capillary Refill: Capillary refill takes less than 2 seconds.   Neurological:      General: No focal deficit present.      Mental Status: He is alert and oriented to person, place, and time.   Psychiatric:         Mood and Affect: Mood normal.         Behavior: Behavior normal.         Procedures      Assessment/Plan   Allergies, medications, history, and pertinent labs/EKGs/Imaging reviewed by SARAHY Portillo.     Medical Decision Making    Patient is well appearing, afebrile, non toxic, not hypoxic, and appropriate for outpatient treatment and management at time of evaluation. Patient presents with ongoing, worsening left knee pain.     Differential includes but not limited to: Fracture,  dislocation, contusion, meniscal tear, osteoarthritis, strain, sprain, other    On exam, patient has diffuse tenderness of the left knee, worse along the anterior aspect.  No joint line pain.  No pain with palpation of the patella or fibular head.  No appreciable laxity, swelling, ecchymosis, deformity or dislocation.  Skin normal color and temperature.  Patient is able to bear weight but with some discomfort.  Normal upper and lower leg exam.    X-ray ordered.  Image independently reviewed by myself and interpreted as osteoarthritis of the left knee.  No acute fracture or osseous abnormality noted.    I discussed the signs with the patient and provided him with a hard copy of the radiology read.  I offered to provide him with crutches but he states he has some at home.  Recommended rest, ice, elevation and compression as well as over-the-counter pain medication as needed for symptom relief.  I offered to place a orthopedic referral on his behalf but he states he has an orthopedics who is already established with.  He will reach out to the office on Monday to schedule an appointment.  He was discharged in stable condition.    Counseling: Spoke with the patient and discussed today´s findings, in addition to providing specific details for the plan of care and expected course.  Patient was given the opportunity to ask questions.     Discussed return precautions and importance of follow-up. Advised to follow-up with PCP. I specifically advised to return to the ED for changing or worsening symptoms, new symptoms, complaint specific precautions, and precautions listed on the discharge paperwork.       I advised the patient that the urgent care evaluation and treatment provided today doesn't end their need for medical care. It is very important that they follow-up with their primary care provider or other specialist as instructed.     The plan of care was mutually agreed upon with the patient. The patient and/or family  were given the opportunity to ask questions. All questions and concerns were answered to the best of my ability with today's information.    Dictation software was used in the creation of this note which does not evaluate or correct for typographical, spelling, syntax or grammatical errors.       Orders and Diagnoses  There are no diagnoses linked to this encounter.    Medical Admin Record  === 03/22/25 ===    XR KNEE 3 VIEWS LEFT    - Impression -  Osteoarthritis left knee.    MACRO:  None.    Signed by: Kayla Landis 3/22/2025 10:00 AM  Dictation workstation:   [id number]      Follow Up Instructions  No follow-ups on file.    Patient disposition: Home you were seen at Urgent Care today for ++. Please treat as discussed. Please take medications as prescribed. Monitor for red flags which we spoke about, If your symptoms change, worsen or become concerning in any way, please go to the emergency room immediately, otherwise you can followup with your PCP in 2-3 days as needed    Electronically signed by SARAHY Portillo  9:00 AM

## 2025-03-22 NOTE — PATIENT INSTRUCTIONS
You were seen at Urgent Care today for left knee pain.  X-rays negative for fracture or dislocation.  Please treat as discussed.  Monitor for red flags which we spoke about, If your symptoms change, worsen or become concerning in any way, please go to the emergency room immediately, otherwise you can followup with your orthopedic surgeon next week as planned.

## 2025-03-23 DIAGNOSIS — M25.569 PAIN IN UNSPECIFIED KNEE: ICD-10-CM

## 2025-03-24 ENCOUNTER — APPOINTMENT (OUTPATIENT)
Dept: ORTHOPEDIC SURGERY | Facility: HOSPITAL | Age: 40
End: 2025-03-24
Payer: COMMERCIAL

## 2025-03-24 DIAGNOSIS — S83.242A ACUTE MEDIAL MENISCUS TEAR OF LEFT KNEE, INITIAL ENCOUNTER: ICD-10-CM

## 2025-03-24 DIAGNOSIS — S83.412A TEAR OF MCL (MEDIAL COLLATERAL LIGAMENT) OF KNEE, LEFT, INITIAL ENCOUNTER: ICD-10-CM

## 2025-03-24 PROCEDURE — 99214 OFFICE O/P EST MOD 30 MIN: CPT | Performed by: SPECIALIST/TECHNOLOGIST

## 2025-03-24 PROCEDURE — 1036F TOBACCO NON-USER: CPT | Performed by: SPECIALIST/TECHNOLOGIST

## 2025-03-24 PROCEDURE — L1812 KO ELASTIC W/JOINTS PRE OTS: HCPCS | Performed by: SPECIALIST/TECHNOLOGIST

## 2025-03-24 RX ORDER — INDOMETHACIN 75 MG/1
75 CAPSULE, EXTENDED RELEASE ORAL
Qty: 90 CAPSULE | Refills: 1 | Status: SHIPPED | OUTPATIENT
Start: 2025-03-24

## 2025-03-24 NOTE — PROGRESS NOTES
Chief Complaint: Pain and Follow-up of the Left Knee (DOLI 3/3/25)       HPI:  Rene Taylor is a 39 y.o. male presenting today, with his wife, for his left knee.  He states that last Thursday, while chasing his son, his son abruptly stopped causing him to plant and twist on his left leg.  He denies falling.  He does report a sharp sensation with an occasional sense of it giving out over the medial aspect and inside of his knee.  Pain is worsened with full weightbearing and walking especially going from sitting to standing.  He has been using Aleve and ice and resting since time of injury.  He feels slightly better.  He was seen at the urgent care on 3/22/2025 where x-rays were performed and told to follow-up with orthopedics.  He has been getting serial corticosteroid injections, his last 1 being on 3/3/2025.  He takes Indocin daily and has recently started GLP-1 injections for weight loss.  Denies numbness or tingling in the left lower extremity.  Presents for treatment recommendations.    Objective     ROS:  Constitutional: No fever, no chills, not feeling tired, no recent weight gain and no recent weight loss  ENT: No nosebleeds  Cardiovascular: No chest pain  Respiratory: No shortness of breath and no cough  Gastrointestinal: No abdominal pain, no nausea, no diarrhea, and no vomiting  Musculoskeletal: Positive for left knee pain  Integumentary: No rashes and no skin lesions  Neurological: No headache  Psychiatric: No sleep disturbances no depression  Endocrine: No muscle weakness and no muscle cramps  Hematologic/lymphatic: No swelling glands and no tendency for easy bruising    Past Medical History:   Diagnosis Date    Personal history of other diseases of the nervous system and sense organs     History of sleep apnea    Sleep apnea         Past Surgical History:   Procedure Laterality Date    KNEE SURGERY Left         Social Connections: Not on file          Physical Exam:  General appearance: WN, WD male, in  no acute distress  Skin: No rashes, lesions or wounds  Head: Normocephalic, no evidence of trauma  Eye: EOMI, conjunctiva clear, no discharge  ENT: Nares patent  Neck: No abnormal contour, tracheal midline  Chest/lungs: No respiratory distress, speaking in complete sentences  Musculoskeletal: Tender to palpation over the medial joint line.  POSITIVE Fede.  POSITIVE valgus stress test at 0 and 30 degrees.  Stable varus stress test at 0 and 30 degrees.  Stable Lachman's.  Stable posterior drawer.  Knee flexion to 100 degrees bilaterally.  Patellofemoral crepitus noted bilaterally.  Small effusion.  No decreased ROM, muscle wasting, rigidity    Neurological: A&O x3, no focal deficits, intact bilateral LE  Psych: normal affect, mood, appearance      Image Results:  X-rays from the urgent care taken on 3/22/2025 reviewed with the patient and his wife in the office and show no fractures or dislocations.  Moderate to severe medial and patellofemoral joint osteoarthritis.      Assessment/Plan   Encounter Diagnoses:  Acute medial meniscus tear of left knee, initial encounter    Tear of MCL (medial collateral ligament) of knee, left, initial encounter    Orders Placed This Encounter    Knee Brace, Hinged    MR knee left wo IV contrast    Referral to Physical Therapy       The patient, his wife and I discussed his clinical presentation and physical exam findings consistent with right knee medial meniscus tear versus medial collateral ligament sprain.  We discussed his conservative versus surgical treatment options.  Ultimately we agreed upon an MRI of the left knee.    I feel the MRI is medically indicated and necessary based upon his mechanism of injury, a left planted leg and a twist), positive physical exam findings (POSITIVE Fede, and positive valgus stress test at 0 and 30 degrees) and decreased function of the left lower extremity.  Please have the MRI performed in the hospital setting so this can be easily  viewed by the physician as this will be used for presurgical planning purposes.    In the meantime, we agreed upon initiating conservative treatment.  He will continue with his indomethacin.  He will take two 500 mg extra strength Tylenol 3 times daily for pain.  He will continue icing 15 to 20 minutes 2-3 times per day he will continue with rest and elevation.  We agreed upon a referral to physical therapy to focus on quadriceps, gluteus, core strength and stability, in office modalities and home exercise program.  We discussed his weight loss journey and I encouraged him to perform activities to tolerance.  Also, we agreed upon a left knee hinged brace for MCL sprain.  He is currently taking the GLP-1's.  He will follow-up once the MRI is complete.  They are in agreement the plan.  Questions have been answered.    Patient was prescribed a hinged knee brace for left knee MCL sprain.  The patient is ambulatory with or without aid; but, has weakness, instability and/or deformity of their left knee which requires stabilization from this orthosis to improve their function.      Verbal and written instructions for the use, wear schedule, cleaning and application of this item were given.  Patient was instructed that should the brace result in increased pain, decreased sensation, increased swelling, or an overall worsening of their medical condition, to please contact our office immediately.     Orthotic management and training was provided for skin care, modifications due to healing tissues, edema changes, interruption in skin integrity, and safety precautions with the orthosis.        ** This office note was dictated using Dragon voice to text software and was not proofread for spelling or grammatical errors **

## 2025-03-26 DIAGNOSIS — F32.A DEPRESSION, UNSPECIFIED DEPRESSION TYPE: ICD-10-CM

## 2025-03-26 RX ORDER — ESCITALOPRAM OXALATE 10 MG/1
10 TABLET ORAL DAILY
Qty: 90 TABLET | Refills: 3 | Status: SHIPPED | OUTPATIENT
Start: 2025-03-26

## 2025-04-01 DIAGNOSIS — M54.12 CERVICAL RADICULOPATHY: Primary | ICD-10-CM

## 2025-04-01 RX ORDER — METHYLPREDNISOLONE 4 MG/1
4 TABLET ORAL ONCE
Qty: 21 TABLET | Refills: 0 | Status: SHIPPED | OUTPATIENT
Start: 2025-04-01 | End: 2025-04-01

## 2025-04-03 DIAGNOSIS — M54.50 LOW BACK PAIN, UNSPECIFIED BACK PAIN LATERALITY, UNSPECIFIED CHRONICITY, UNSPECIFIED WHETHER SCIATICA PRESENT: Primary | ICD-10-CM

## 2025-04-03 RX ORDER — CYCLOBENZAPRINE HCL 10 MG
10 TABLET ORAL NIGHTLY PRN
Qty: 30 TABLET | Refills: 0 | Status: SHIPPED | OUTPATIENT
Start: 2025-04-03 | End: 2025-06-02

## 2025-04-03 RX ORDER — PREDNISONE 20 MG/1
TABLET ORAL
Qty: 20 TABLET | Refills: 0 | Status: SHIPPED | OUTPATIENT
Start: 2025-04-03 | End: 2025-04-16

## 2025-04-04 ENCOUNTER — HOSPITAL ENCOUNTER (OUTPATIENT)
Dept: RADIOLOGY | Facility: HOSPITAL | Age: 40
Discharge: HOME | End: 2025-04-04
Payer: COMMERCIAL

## 2025-04-04 DIAGNOSIS — S83.412A TEAR OF MCL (MEDIAL COLLATERAL LIGAMENT) OF KNEE, LEFT, INITIAL ENCOUNTER: ICD-10-CM

## 2025-04-04 DIAGNOSIS — S83.242A ACUTE MEDIAL MENISCUS TEAR OF LEFT KNEE, INITIAL ENCOUNTER: ICD-10-CM

## 2025-04-04 PROCEDURE — 73721 MRI JNT OF LWR EXTRE W/O DYE: CPT | Mod: LT

## 2025-04-07 ENCOUNTER — TELEPHONE (OUTPATIENT)
Dept: ORTHOPEDIC SURGERY | Facility: HOSPITAL | Age: 40
End: 2025-04-07
Payer: COMMERCIAL

## 2025-04-07 ENCOUNTER — APPOINTMENT (OUTPATIENT)
Dept: RADIOLOGY | Facility: HOSPITAL | Age: 40
End: 2025-04-07
Payer: COMMERCIAL

## 2025-04-07 NOTE — TELEPHONE ENCOUNTER
Spoke the patient today regarding his MRI results.  He has significant cartilage loss with subsequent bone marrow edema and tearing of his medial meniscus.  This appears to be all related to the arthritic changes inside the knee joint.  We again discussed his conservative and surgical treatment options.  He had a corticosteroid injection approximately 1 month ago and it is still too soon to repeat this injection.  We discussed using viscosupplementation as an alternative to the corticosteroid injections.  We discussed meeting with my attending physicians who performed total knee replacements as well.  He wishes to pursue with viscosupplementation consultation.  Will have him see Dr. Igor Oconnor for consultation.  He will follow-up on an as-needed basis.  He is in agreement the plan.  Questions are answered.

## 2025-04-26 DIAGNOSIS — M54.50 LOW BACK PAIN, UNSPECIFIED BACK PAIN LATERALITY, UNSPECIFIED CHRONICITY, UNSPECIFIED WHETHER SCIATICA PRESENT: ICD-10-CM

## 2025-04-28 RX ORDER — CYCLOBENZAPRINE HCL 10 MG
10 TABLET ORAL NIGHTLY PRN
Qty: 30 TABLET | Refills: 0 | Status: SHIPPED | OUTPATIENT
Start: 2025-04-28 | End: 2025-06-27

## 2025-05-01 ENCOUNTER — APPOINTMENT (OUTPATIENT)
Dept: UROLOGY | Facility: CLINIC | Age: 40
End: 2025-05-01
Payer: COMMERCIAL

## 2025-06-06 ENCOUNTER — OFFICE VISIT (OUTPATIENT)
Dept: ORTHOPEDIC SURGERY | Facility: CLINIC | Age: 40
End: 2025-06-06
Payer: COMMERCIAL

## 2025-06-06 DIAGNOSIS — M17.0 PRIMARY OSTEOARTHRITIS OF BOTH KNEES: Primary | ICD-10-CM

## 2025-06-06 PROCEDURE — 99214 OFFICE O/P EST MOD 30 MIN: CPT | Performed by: STUDENT IN AN ORGANIZED HEALTH CARE EDUCATION/TRAINING PROGRAM

## 2025-06-06 NOTE — PROGRESS NOTES
Sports Medicine Office Note    Today's Date:  06/06/2025     HPI: Rene Taylor is a 39 y.o. *** who presents today for ***    ***    *** has no other complaints.    Physical Examination:     ***    Imaging:  Radiographs of the *** were reviewed and revealed ***.    The studies were reviewed by me personally in the office today.    === 03/22/25 ===    XR KNEE 3 VIEWS LEFT    - Impression -  Osteoarthritis left knee.    MACRO:  None.    Signed by: Kayla Landis 3/22/2025 10:00 AM  Dictation workstation:   [id number]    Problem List Items Addressed This Visit    None    Assessment and Plan:    Most recent  Radiographs of the Bilateral knees were reviewed and revealed degenerative joint changes.  Patient has failed conservative management as outlined below:  Continued pain 6 weeks after last steroid injection. Last steroid injection done on 3/3/2025  Continued pain after 6 weeks with anti-inflammatory and/or analgesic medication(s). Dosage and daily intake: Ibuprofen 600-800 mg three times daily (or alternative NSAID equivalent), Acetaminophen 1,000 mg three times daily, and Topical diclofenac applied three times daily  Continued pain after 6 weeks with physician-directed daily activity modification and/or physical therapy    Patient meets at least five (5) of the ACR clinical/laboratory criteria listed below:  Bony enlargement, Bony tenderness, Crepitus on active motion, < 30 minutes of morning stiffness, and No palpable warmth of synovium    Patient is NOT scheduled to undergo total knee replacement within 6 months of treatment.     We reviewed the exam and imaging findings and discussed the conservative and surgical treatment options. We agreed ***. Physical therapy referral *** provided and discussed the importance of regular home exercises.  Recommended prescription doses of Tylenol and encouraged use of ice or heat as needed for acute flares of pain.  Plan for follow-up in *** for re-evaluation, otherwise may  follow-up sooner if any new concerns arise.  Discussed this plan with the patient who is understanding and agreeable.    **This note was dictated using Dragon speech recognition software and was not corrected for spelling or grammatical errors**.    Igor Oconnor DO  Primary Care Sports Medicine  [facility] [place]    has failed conservative management as outlined below:  Continued pain 6 weeks after last steroid injection. Last steroid injection done on 3/3/2025  Continued pain after 6 weeks with anti-inflammatory and/or analgesic medication(s). Dosage and daily intake: Ibuprofen 600-800 mg three times daily (or alternative NSAID equivalent), Acetaminophen 1,000 mg three times daily, and Topical diclofenac applied three times daily  Continued pain after 6 weeks with physician-directed daily activity modification and/or physical therapy    Patient meets at least five (5) of the ACR clinical/laboratory criteria listed below:  Bony enlargement, Bony tenderness, Crepitus on active motion, < 30 minutes of morning stiffness, and No palpable warmth of synovium    Patient is NOT scheduled to undergo total knee replacement within 6 months of treatment.     We reviewed the exam and imaging findings and discussed the conservative and surgical treatment options. We agreed to submit for insurance approval of viscosupplementation injections as he has failed all other conservative management strategies to this point.  Encouraged him to follow-up with physical therapy and discussed the importance of regular home exercises.  Recommended prescription doses of Tylenol, Voltaren gel, and encouraged use of ice/heat, compression, elevation, and rest as needed for acute flares of pain.  He may otherwise take OTC anti-inflammatory such as ibuprofen up to 600 mg 3 times daily with food as needed for acute flares of pain.  He may continue with knee bracing for added comfort/stability.  Plan for follow-up if/when viscosupplementation injections are approved through insurance, otherwise may follow-up sooner if any new concerns arise.  Discussed this plan with the patient who is understanding and agreeable.    **This note was dictated using Dragon speech recognition software and was not corrected for spelling or grammatical errors**.    Igor Oconnor,  DO  Primary Care Sports Medicine  [facility] [place]

## 2025-07-18 ENCOUNTER — HOSPITAL ENCOUNTER (OUTPATIENT)
Dept: RADIOLOGY | Facility: EXTERNAL LOCATION | Age: 40
Discharge: HOME | End: 2025-07-18

## 2025-07-18 ENCOUNTER — APPOINTMENT (OUTPATIENT)
Dept: ORTHOPEDIC SURGERY | Facility: CLINIC | Age: 40
End: 2025-07-18
Payer: COMMERCIAL

## 2025-07-18 DIAGNOSIS — M17.0 PRIMARY OSTEOARTHRITIS OF BOTH KNEES: Primary | ICD-10-CM

## 2025-07-18 PROCEDURE — 99214 OFFICE O/P EST MOD 30 MIN: CPT | Performed by: STUDENT IN AN ORGANIZED HEALTH CARE EDUCATION/TRAINING PROGRAM

## 2025-07-18 PROCEDURE — 20611 DRAIN/INJ JOINT/BURSA W/US: CPT | Performed by: STUDENT IN AN ORGANIZED HEALTH CARE EDUCATION/TRAINING PROGRAM

## 2025-07-18 NOTE — PROGRESS NOTES
Sports Medicine Office Note    Today's Date:  07/18/2025     HPI: Rene Taylor is a 40 y.o. male who presents today for follow-up of chronic bilateral knee pain, initially referred by Sathish Coy PA-C, for consideration of viscosupplementation injections.      6/6/2025: He has had pain in both knees for the last few years without inciting injury/trauma.  States his pain has progressively worsened since initial onset.  He was previously following with Sathish Coy PA-C, and has had multiple rounds of corticosteroid injections, last performed on 3/3/2025.  He states he previously got roughly 3 months of relief with these injections, however did not get significant relief following most recent injections.  He states his pain is throughout both knees and worse with walking/standing for longer durations, going up or down stairs, squatting down, and getting into or out of seated position.  Denies any new mechanical locking/catching or any redness, warmth, bruising, radiation of pain, numbness, tingling, weakness.  States he has mild chronic swelling of both knees without acute worsening.  He has tried OTC anti-inflammatory/analgesics, rest/activity modifications, PT/HEP, and knee bracing without significant improvement.  He states his left knee pain is worse than right, and has had MRI left knee performed on 4/4/2025 which revealed radial medial meniscal tearing with moderate to high-grade articular cartilage loss, predominantly in medial compartment.  Given he did not get significant relief with most recent cortisone injections, he was referred to me for consideration of viscosupplementation injections.  He would like to proceed with viscosupplementation injections if appropriate.    7/18/2025: He presents today for follow-up of chronic bilateral knee pain with bilateral knee OA and for trial of viscosupplementation injections with Durolane.  Denies interval injury/trauma or any acute worsening of swelling or any new  redness, warmth, bruising, numbness, tingling, weakness.  He would like to proceed with viscosupplementation injections today if appropriate.     He has no other complaints.     Physical Examination:      The RIGHT knee has trace to grade 1 joint effusion. Patella crepitus and grind are positive. There is tenderness to the medial joint line. There is tenderness to the lateral joint line. Flexion and extension are without mechanical blocking. Varus & valgus stress test at 0° and 30° of flexion elicited medial and lateral joint line laxity with associated pain that improved with return to neutral position. Fede's is equivocal. Otherwise, there is no instability with stress testing.  The LEFT knee has trace to grade 1 joint effusion. Patella crepitus and grind are positive. There is tenderness to the medial joint line. There is tenderness to the lateral joint line. Flexion and extension are without mechanical blocking. Varus & valgus stress test at 0° and 30° of flexion elicited medial and lateral joint line laxity with associated pain that improved with return to neutral position. Fede's is equivocal. Otherwise, there is no instability with stress testing.  Skin - no rashes, sores, or open lesions. Strength, sensory and vascular exams are otherwise normal. There is no clubbing, cyanosis or edema.  Gait is slightly antalgic and tandem.      Imaging:  Radiographs of the right knee obtained 6/7/2023 and left knee obtained 3/22/2025 were reviewed and revealed moderate tricompartmental DJD, worse in medial and patellofemoral compartments bilaterally, otherwise no evidence of acute osseous abnormalities.     MRI left knee performed on 4/4/2025 was reviewed and revealed radial medial meniscal tearing with moderate to high-grade articular cartilage loss, predominantly in medial compartment.      The studies were reviewed by me personally in the office today.    Problem List Items Addressed This Visit    None  Visit  Diagnoses         Codes      Primary osteoarthritis of both knees    -  Primary M17.0    Relevant Orders    Point of Care Ultrasound (Completed)    L Inj/Asp: bilateral knee          Procedure Note:  Ultrasound-guided left knee viscosupplementation injection with Durolane: After consent was obtained, the left knee was prepped in a sterile fashion. Ultrasound guidance was used to help insure proper needle placement into the  joint, decrease patient discomfort, and decrease collateral damage. The joint was visualized and  Durolane 60 mg was injected without any complications. Ultrasound images were saved on an internal file for later reference. The patient tolerated the procedure well and the area was cleaned and bandaged.    Ultrasound-guided right knee viscosupplementation injection with Durolane: After consent was obtained, the right knee was prepped in a sterile fashion. Ultrasound guidance was used to help insure proper needle placement into the  joint, decrease patient discomfort, and decrease collateral damage. The joint was visualized and  Durolane 60 mg was injected without any complications. Ultrasound images were saved on an internal file for later reference. The patient tolerated the procedure well and the area was cleaned and bandaged.  Patient ID: Rene Taylor is a 40 y.o. male.    L Inj/Asp: bilateral knee on 7/18/2025 4:05 PM  Indications: pain  Details: 22 G needle, ultrasound-guided superolateral approach  Medications (Right): 60 mg sodium hyaluronate 60 mg/3 mL  Medications (Left): 60 mg sodium hyaluronate 60 mg/3 mL  Outcome: tolerated well, no immediate complications  Procedure, treatment alternatives, risks and benefits explained, specific risks discussed. Consent was given by the patient. Immediately prior to procedure a time out was called to verify the correct patient, procedure, equipment, support staff and site/side marked as required. Patient was prepped and draped in the usual sterile  fashion.         Assessment and Plan:    We reviewed the exam and imaging findings and discussed the conservative and surgical treatment options. We agreed to  bilateral knee ultrasound-guided viscosupplementation injection with Durolane.  Patient was educated on the signs and symptoms of local reaction and infection and should call the office if experiencing any of these. Activity modifications discussed and recommended rest on the knee for the next 48 hours. After that, patient may return to normal activity.  Discussed the importance of regular home exercise program as tolerated.  Recommended prescription doses of Tylenol, Voltaren gel, and encouraged use of ice as needed for acute flares of pain. Patient understands that this is not a cure, but an attempt to buy some time, decrease discomfort, and slow the arthritic process. Patient may ultimately require surgery, but we will exhaust all of our conservative treatment options prior to that. Plan for follow-up in  8 weeks, otherwise may follow-up sooner if any new concerns arise.  Discussed this plan with the patient who is understanding and agreeable.    **This note was dictated using Dragon speech recognition software and was not corrected for spelling or grammatical errors**.    Igor Oconnor DO  Primary Care Sports Medicine  [facility] [place]

## 2025-07-27 ENCOUNTER — OFFICE VISIT (OUTPATIENT)
Dept: URGENT CARE | Age: 40
End: 2025-07-27
Payer: COMMERCIAL

## 2025-07-27 VITALS
DIASTOLIC BLOOD PRESSURE: 76 MMHG | BODY MASS INDEX: 64.44 KG/M2 | HEART RATE: 66 BPM | SYSTOLIC BLOOD PRESSURE: 131 MMHG | OXYGEN SATURATION: 93 % | TEMPERATURE: 98 F | RESPIRATION RATE: 20 BRPM | WEIGHT: 315 LBS

## 2025-07-27 DIAGNOSIS — R21 RASH: Primary | ICD-10-CM

## 2025-07-27 PROCEDURE — 3075F SYST BP GE 130 - 139MM HG: CPT

## 2025-07-27 PROCEDURE — 96372 THER/PROPH/DIAG INJ SC/IM: CPT

## 2025-07-27 PROCEDURE — 3078F DIAST BP <80 MM HG: CPT

## 2025-07-27 PROCEDURE — 99213 OFFICE O/P EST LOW 20 MIN: CPT

## 2025-07-27 PROCEDURE — 1036F TOBACCO NON-USER: CPT

## 2025-07-27 RX ORDER — PREDNISONE 20 MG/1
40 TABLET ORAL DAILY
Qty: 10 TABLET | Refills: 0 | Status: SHIPPED | OUTPATIENT
Start: 2025-07-27 | End: 2025-08-01

## 2025-07-27 RX ORDER — TRIAMCINOLONE ACETONIDE 40 MG/ML
40 INJECTION, SUSPENSION INTRA-ARTICULAR; INTRAMUSCULAR ONCE
Status: COMPLETED | OUTPATIENT
Start: 2025-07-27 | End: 2025-07-27

## 2025-07-27 RX ADMIN — TRIAMCINOLONE ACETONIDE 40 MG: 40 INJECTION, SUSPENSION INTRA-ARTICULAR; INTRAMUSCULAR at 11:31

## 2025-07-27 NOTE — PROGRESS NOTES
Subjective   Patient ID: Rene Taylor is a 40 y.o. male. They present today with a chief complaint of Rash (RASH started 2 days ago on flank, abdomen, hands, face. Recently returned from [place]).    History of Present Illness    Rash        40-year-old male patient presents today for concerns of a rash on multiple areas of his body.  He states that he was recently on vacation in [place], and had been in the sun in the ocean a lot.  He did not have any seafood or shellfish.  He is not having any difficulties breathing or swallowing.  He has not tried anything over-the-counter to help with his rash.  He is here for evaluation.  Past Medical History  Allergies as of 07/27/2025 - Reviewed 07/27/2025   Allergen Reaction Noted    Animal dander Runny nose 04/15/2024    Cat dander Unknown 08/26/2019       Prescriptions Prior to Admission[1]     Medical History[2]    Surgical History[3]     reports that he has never smoked. He has never used smokeless tobacco. He reports current alcohol use of about 4.0 standard drinks of alcohol per week. He reports that he does not use drugs.    Review of Systems  Review of Systems   Skin:  Positive for rash.                                  Objective    Vitals:    07/27/25 1116   BP: 131/76   Pulse: 66   Resp: 20   Temp: 36.7 °C (98 °F)   SpO2: 93%   Weight: (!) 210 kg (462 lb)     No LMP for male patient.    Physical Exam    Skin:     Findings: Rash present. Rash is urticarial.      Comments: Sporadic areas of body. No open wounds, drainage, or concern for cellulitis.          Procedures    Point of Care Test & Imaging Results from this visit  No results found for this visit on 07/27/25.   Imaging  No results found.    Cardiology, Vascular, and Other Imaging  No other imaging results found for the past 2 days      Diagnostic study results (if any) were reviewed by SARAHY Amado.    Assessment/Plan   Allergies, medications, history, and pertinent  labs/EKGs/Imaging reviewed by SARAHY Amado.     Medical Decision Making  No concern for cellulitis at this time.  Patient agreeable to Kenalog injection in clinic, and going home with a prescription for prednisone from our in-house pharmacy; medication education provided.  Red flag symptoms and when to report to the emergency department discussed with patient.  Referral to allergist placed as well.  Patient is in no acute distress and is hemodynamically stable at the time of discharge. As a result of the work-up, the patient was discharged home.  he was informed of his diagnosis and instructed to come back with any concerns or worsening of condition.  he and was agreeable to the plan as discussed above.  he was given the opportunity to ask questions.  All of the patient's questions were answered.    Orders and Diagnoses  Diagnoses and all orders for this visit:  Rash  -     predniSONE (Deltasone) 20 mg tablet; Take 2 tablets (40 mg) by mouth once daily for 5 days.  -     triamcinolone acetonide (Kenalog-40) injection 40 mg  -     Referral to Allergy; Future      Medical Admin Record  Administrations This Visit       triamcinolone acetonide (Kenalog-40) injection 40 mg       Admin Date  07/27/2025 Action  Given Dose  40 mg Route  intramuscular Documented By  Fiordaliza Low MA                    Patient disposition: Home    Electronically signed by SARAHY Amado  12:01 PM           [1] (Not in a hospital admission)   [2]   Past Medical History:  Diagnosis Date    Personal history of other diseases of the nervous system and sense organs     History of sleep apnea    Sleep apnea    [3]   Past Surgical History:  Procedure Laterality Date    KNEE SURGERY Left

## 2025-08-06 DIAGNOSIS — M25.569 PAIN IN UNSPECIFIED KNEE: ICD-10-CM

## 2025-08-06 RX ORDER — INDOMETHACIN 75 MG/1
75 CAPSULE, EXTENDED RELEASE ORAL
Qty: 90 CAPSULE | Refills: 1 | OUTPATIENT
Start: 2025-08-06

## 2025-08-06 NOTE — TELEPHONE ENCOUNTER
Patient has not been seen in over 1 year (7/29/24).  Must be seen in office before refills can be given.

## 2025-09-12 ENCOUNTER — APPOINTMENT (OUTPATIENT)
Dept: ORTHOPEDIC SURGERY | Facility: CLINIC | Age: 40
End: 2025-09-12
Payer: COMMERCIAL